# Patient Record
Sex: FEMALE | Race: BLACK OR AFRICAN AMERICAN
[De-identification: names, ages, dates, MRNs, and addresses within clinical notes are randomized per-mention and may not be internally consistent; named-entity substitution may affect disease eponyms.]

---

## 2018-04-01 ENCOUNTER — HOSPITAL ENCOUNTER (EMERGENCY)
Dept: HOSPITAL 24 - ER | Age: 68
LOS: 1 days | Discharge: HOME | End: 2018-04-02
Payer: COMMERCIAL

## 2018-04-01 VITALS — DIASTOLIC BLOOD PRESSURE: 83 MMHG | SYSTOLIC BLOOD PRESSURE: 179 MMHG

## 2018-04-01 VITALS — BODY MASS INDEX: 33.7 KG/M2

## 2018-04-01 DIAGNOSIS — G31.89: ICD-10-CM

## 2018-04-01 DIAGNOSIS — J01.80: ICD-10-CM

## 2018-04-01 DIAGNOSIS — R51: Primary | ICD-10-CM

## 2018-04-01 PROCEDURE — 99283 EMERGENCY DEPT VISIT LOW MDM: CPT

## 2018-04-01 PROCEDURE — 70450 CT HEAD/BRAIN W/O DYE: CPT

## 2018-04-01 PROCEDURE — 96372 THER/PROPH/DIAG INJ SC/IM: CPT

## 2018-04-01 PROCEDURE — 99284 EMERGENCY DEPT VISIT MOD MDM: CPT

## 2018-04-02 NOTE — CT
CT head without contrast



Indication: Headache



Technique: Axial images from the skullbase to the vertex without contrast. Coronal and sagittal refor
mats provided.



Findings: There is no acute intracranial hemorrhage, mass or mass effect. No extra-axial fluid collec
tion or abnormal area of hypoattenuation suggest infarction seen. There is mild atrophy with ex vacuo
 ventricle and sulcal enlargement. Microangiopathic white matter changes noted. Review of bone window
s shows no osseous lesion. Paranasal sinuses and mastoid air cells are clear.



Impression:

1. No acute intracranial hemorrhage.



2. Mild atrophic change







Reported By:Electronically Signed by SAAD MARTINEZ MD at 4/2/2018 1:53:47 AM

## 2018-04-02 NOTE — DR.GENAD
HPI





- PCP


Primary Care Physician: lizette doshi





- HPI Comment


HPI Comment: CURRENTLY UNDER TREATMENT FOR SINUS INFECTION. HAD HEADACHE WITH 

INFECTION. TONIGHT PAIN GOT WORSE. SHE SAID IS THE WORSE HEADACHE EVER. MED AT 

HOME DID NOT HELP PAIN.





- Complaint/Symptoms


Chief Complaint Doctors Comments: HEADACHE, SINUS INFECTION TIMES 4 DAYS.


Chief Complaint:: pt c/o headache and sinus infection





- Nurses notes reviewed


Nurses Notes Review: Yes





- Source


History Provided: Patient





- Mode of Arrival


Mode of Arrival: Ambulatory





- Timing


Onset of Chief Complaint: 18





- Duration


Duration: Constant


Duration: Days





- Severity


Severity: Moderate





PMH





- PMH


Past Medical History: Yes


Past Medical History: Arthritis, Headaches, Hypothyroidism


Past Surgical History: Yes


Surgical History: 





- Family History


History of Family Medical Conditions: No





- Social History


Does patient currently use any type of tobacco product: No


Have you used tobacco products in the last 12 months: No


Does any household member use tobacco: No


Alcohol Use: None


Do you use any recreational Drugs:: No


Lives With: Family


Lives Where: Home





- infectious screening


In the last 2 months have you had wt loss of >10#?: NO


Have you had fever, night sweats or hemotysis?: No


Have you traveled outside the country in the last 6 months?: No


Isolation: Standard





ROS





- Review of Systems


Constitutional: Weakness, Fatigue.  negative: Chills, Fever


Eyes: negative: Eye Pain, Discharge


ENTM: No Symptoms Reported, Nose Discharge, Nose Congestion.  negative: Pulling 

on Ears


Respiratoy: Short of Breath.  negative: Productive Cough, Non-Productive Cough, 

Wheezing, Hemoptysis


Cardiovascular: Chest Pain, Edema


Gastrointestinal/Abdominal: No Symptoms Reported


Genitourinary: No Symptoms Reported


Neurological: No Symptoms Reported, Weakness, Dizziness


Musculoskeletal: Back Pain, Joint Pain, Muscle Pain


Integumentary: No Symptoms Reported


Hematologic/Lymphatic: No Symptoms Reported


Endocrine: No Symptoms Reported


All Other Systems: Reviewed and Negative





PE





- Vital Signs


Vitals: 


 





Temperature                      98 F


Pulse Rate                       69


Respiratory Rate                 20


Blood Pressure                   179/83


O2 Sat by Pulse Oximetry         98











- General


Limitations: No Limitations


General Appearance: Alert





- Head


Head Exam: Normal Inspection





- Eyes


Eye exam: Normal Appearance





- ENT


ENT Exam: Normal  External Ear Exam


External Ear Exam: Normal External Inspection


TM/Canal Exam: Bilateral Normal


Nose Exam: Normal Nose Exam


Throat Exam: Normal Inspection





- Neck


Neck Exam: Trachea Midline





- Chest


Chest Inspection: Symmetric Chest Wall Rise





- Respiratory


Respiratory Exam: Normal Lung Sounds Bilat


Respiratory Exam: Bilateral Clear to Auscultation





- Cardiovascular


Cardiovascular Exam: Regular Rate, Normal Rhythm, Normal Heart Sounds





- Abdominal Exam


Abdominal Exam: Normal Bowel Sounds, Soft.  negative: Tenderness





- Extremities


Extremities Exam: Tenderness (KNEES AND ANKLES SWOLLEN AND TENDER.)





- Back


Back Exam: Tenderness (LOWER BACK), Paraspinal Tenderness (LOWER BACK.)





- Neurologic


Neurological Exam: Alert, Oriented X3





- Psychiatric


Psychiatric Exam: Normal Affect, Normal Mood





- Skin


Skin Exam: Normal Color





MDM





- Additional Information


Additional Information Obtained From: Family





- Differential Diagnosis


Differential Diagnosis: CHEST PAIN, ARTHRITIS





Course





- Treatment


Treatment: IM TORADOL AND ROCEPHIN. IN ED.SEE ORDERS.





- Reevaluation


1st: Improved





- Education/Counseling


Education/Counseling: Patient, Education


Educated On: Diagnosis, Needs for Follow Up





ROR





- Labs Reviewed


Laboratory Results Reviewed?: Yes





- XRAY


XRAY Interpreted by: Radiologist


XRAY Findings: REPORT DISCUSS WITH PATIENT.





- EKG


Rhythm: NSR (EKG NOTED)





- Diagnosis


Discharge Problem: 


Headache


Qualifiers:


 Headache type: unspecified Headache chronicity pattern: acute headache 

Intractability: intractable Qualified Code(s): R51 - Headache





Sinusitis


Qualifiers:


 Sinusitis location: unspecified location Chronicity: acute Recurrence: not 

specified as recurrent Qualified Code(s): J01.90 - Acute sinusitis, unspecified








- Discharge Plan


Disposition:  HOME, SELF-CARE


Condition: Stable





- Follow ups/Referrals


Follow ups/Referrals: 


[facility],None [Primary Care Provider] - 1 day





- Instructions


Instructions:  Sinusitis, Adult, Easy-to-Read, Sinus Headache, Easy-to-Read


Additional Instructions: 


RETURN TO ED IF WORSE.